# Patient Record
Sex: FEMALE | Race: WHITE
[De-identification: names, ages, dates, MRNs, and addresses within clinical notes are randomized per-mention and may not be internally consistent; named-entity substitution may affect disease eponyms.]

---

## 2020-08-20 LAB
BUN/CREAT SERPL: 11
CREAT SERPL-MCNC: 0.8 MG/DL (ref 0.6–1.3)
GFR SERPLBLD BASED ON 1.73 SQ M-ARVRAT: > 60 ML/MIN

## 2020-10-09 ENCOUNTER — HOSPITAL ENCOUNTER (OUTPATIENT)
Dept: HOSPITAL 75 - ONC | Age: 60
LOS: 39 days | Discharge: HOME | End: 2020-11-17
Attending: RADIOLOGY
Payer: MEDICARE

## 2020-10-09 DIAGNOSIS — Z51.0: Primary | ICD-10-CM

## 2020-10-09 DIAGNOSIS — C70.0: ICD-10-CM

## 2020-10-09 PROCEDURE — 77301 RADIOTHERAPY DOSE PLAN IMRT: CPT

## 2020-10-09 PROCEDURE — 77334 RADIATION TREATMENT AID(S): CPT

## 2020-10-09 PROCEDURE — 77336 RADIATION PHYSICS CONSULT: CPT

## 2020-10-09 PROCEDURE — 77386: CPT

## 2020-10-09 PROCEDURE — 77338 DESIGN MLC DEVICE FOR IMRT: CPT

## 2020-10-09 PROCEDURE — 77290 THER RAD SIMULAJ FIELD CPLX: CPT

## 2020-10-09 PROCEDURE — 84520 ASSAY OF UREA NITROGEN: CPT

## 2020-10-09 PROCEDURE — 82565 ASSAY OF CREATININE: CPT

## 2020-10-09 PROCEDURE — 77300 RADIATION THERAPY DOSE PLAN: CPT

## 2020-12-09 LAB
BUN/CREAT SERPL: 11
CREAT SERPL-MCNC: 0.82 MG/DL (ref 0.6–1.3)
GFR SERPLBLD BASED ON 1.73 SQ M-ARVRAT: > 60 ML/MIN

## 2020-12-10 ENCOUNTER — HOSPITAL ENCOUNTER (OUTPATIENT)
Dept: HOSPITAL 75 - RAD | Age: 60
End: 2020-12-10
Attending: RADIOLOGY
Payer: MEDICARE

## 2020-12-10 DIAGNOSIS — C70.0: Primary | ICD-10-CM

## 2020-12-10 PROCEDURE — 70553 MRI BRAIN STEM W/O & W/DYE: CPT

## 2020-12-10 NOTE — DIAGNOSTIC IMAGING REPORT
PROCEDURE: MR imaging of the brain with and without contrast.



TECHNIQUE: Multiplanar, multisequence MR imaging of the brain was

performed with and without contrast.



INDICATION: Brain surgery in 2005. History of meningioma. Memory

and gait issues.



COMPARISON: 05/29/2020.



FINDINGS: Prior right-sided craniotomy changes are noted. There

are numerous enhancing extra-axial lesions involving primarily

the right cerebral hemisphere. Enhancing lobular mass along the

right convexity overlying the right parietal lobe measures 2.8 x

2.1 cm and 2.6 cm craniocaudal, previously measuring 2.8 x 2.0 x

2.4 cm. A mass along the right aspect of the posterior falx

cerebri measures 1.9 x 1.3 cm and 2.3 cm craniocaudal, previously

measuring 2.1 x 1.5 x 2.4 cm. A multilobular mass along the right

convexity overlying the right frontotemporal region measures 4.4

x 2.0 cm and 3.5 cm craniocaudal, previously measuring 5.7 x 2.5

x 3.2 cm. There has been interval increase in associated edema in

the right parieto-occipital region with increased right-to-left

midline shift of 0.7 cm, previously measuring 0.5 cm. There is

continued effacement of the basilar cisterns. 



No acute ischemia or hemorrhage. No acute hydrocephalus. The

sellar and suprasellar regions have a normal appearance. 



There is caudal ectopia of the cerebellar tonsils with crowding

of the foramen magnum.



The paranasal sinuses and mastoid air cells demonstrate normal

signal characteristics. The globes and orbits are symmetric and

unremarkable. 



IMPRESSION:

1. Findings consistent with meningiomatosis, primarily involving

the right cerebral hemisphere. There has been interval increase

in size in a lobulated mass overlying the right parieto-occipital

region which may be responsible for the interval increase in

edema in the right parieto-occipital region and increased

right-to-left midline shift of 0.7 cm. There is associated

effacement of the basilar cisterns and caudal ectopia of the

cerebellar tonsils, suggestive of increased intracranial

pressure. No suzy uncal or tonsillar herniation is seen.

2. No acute ischemia or hemorrhage. No acute hydrocephalus.



Dictated by: 



  Dictated on workstation # [id number]

## 2021-02-11 ENCOUNTER — HOSPITAL ENCOUNTER (OUTPATIENT)
Dept: HOSPITAL 75 - ONC | Age: 61
LOS: 26 days | Discharge: HOME | End: 2021-03-09
Attending: RADIOLOGY
Payer: MEDICARE

## 2021-02-11 DIAGNOSIS — C70.0: ICD-10-CM

## 2021-02-11 DIAGNOSIS — Z51.0: Primary | ICD-10-CM

## 2021-02-11 LAB
ALBUMIN SERPL-MCNC: 4.1 GM/DL (ref 3.2–4.5)
ALP SERPL-CCNC: 75 U/L (ref 40–136)
ALT SERPL-CCNC: 29 U/L (ref 0–55)
BASOPHILS # BLD AUTO: 0 10^3/UL (ref 0–0.1)
BASOPHILS NFR BLD AUTO: 0 % (ref 0–10)
BILIRUB SERPL-MCNC: 0.2 MG/DL (ref 0.1–1)
BUN/CREAT SERPL: 18
CALCIUM SERPL-MCNC: 9.8 MG/DL (ref 8.5–10.1)
CHLORIDE SERPL-SCNC: 107 MMOL/L (ref 98–107)
CO2 SERPL-SCNC: 23 MMOL/L (ref 21–32)
CREAT SERPL-MCNC: 0.89 MG/DL (ref 0.6–1.3)
EOSINOPHIL # BLD AUTO: 0 10^3/UL (ref 0–0.3)
EOSINOPHIL NFR BLD AUTO: 0 % (ref 0–10)
GFR SERPLBLD BASED ON 1.73 SQ M-ARVRAT: > 60 ML/MIN
GLUCOSE SERPL-MCNC: 129 MG/DL (ref 70–105)
HCT VFR BLD CALC: 46 % (ref 35–52)
HGB BLD-MCNC: 14.5 G/DL (ref 11.5–16)
LYMPHOCYTES # BLD AUTO: 1.5 10^3/UL (ref 1–4)
LYMPHOCYTES NFR BLD AUTO: 21 % (ref 12–44)
MANUAL DIFFERENTIAL PERFORMED BLD QL: NO
MCH RBC QN AUTO: 30 PG (ref 25–34)
MCHC RBC AUTO-ENTMCNC: 31 G/DL (ref 32–36)
MCV RBC AUTO: 96 FL (ref 80–99)
MONOCYTES # BLD AUTO: 0.5 10^3/UL (ref 0–1)
MONOCYTES NFR BLD AUTO: 6 % (ref 0–12)
NEUTROPHILS # BLD AUTO: 5.3 10^3/UL (ref 1.8–7.8)
NEUTROPHILS NFR BLD AUTO: 72 % (ref 42–75)
PLATELET # BLD: 193 10^3/UL (ref 130–400)
PMV BLD AUTO: 9.4 FL (ref 9–12.2)
POTASSIUM SERPL-SCNC: 3.4 MMOL/L (ref 3.6–5)
PROT SERPL-MCNC: 6.7 GM/DL (ref 6.4–8.2)
SODIUM SERPL-SCNC: 137 MMOL/L (ref 135–145)
WBC # BLD AUTO: 7.4 10^3/UL (ref 4.3–11)

## 2021-02-11 PROCEDURE — 82565 ASSAY OF CREATININE: CPT

## 2021-02-11 PROCEDURE — 85025 COMPLETE CBC W/AUTO DIFF WBC: CPT

## 2021-02-11 PROCEDURE — 99213 OFFICE O/P EST LOW 20 MIN: CPT

## 2021-02-11 PROCEDURE — 80053 COMPREHEN METABOLIC PANEL: CPT

## 2021-02-11 PROCEDURE — 84520 ASSAY OF UREA NITROGEN: CPT

## 2021-02-19 ENCOUNTER — HOSPITAL ENCOUNTER (OUTPATIENT)
Dept: HOSPITAL 75 - RAD | Age: 61
End: 2021-02-19
Attending: RADIOLOGY
Payer: MEDICARE

## 2021-02-19 DIAGNOSIS — C70.0: Primary | ICD-10-CM

## 2021-02-19 PROCEDURE — 70553 MRI BRAIN STEM W/O & W/DYE: CPT

## 2021-02-19 NOTE — DIAGNOSTIC IMAGING REPORT
PROCEDURE: MR imaging of the brain with and without contrast.



TECHNIQUE: Multiplanar, multisequence MR imaging of the brain was

performed with and without contrast.



INDICATION:   History of meningiomatosis, left-sided weakness.

Multiple previous mass resections most recently in 2005. Compared

with the study 12/10/2020.



FINDINGS: 

there are multiple right greater than left convexity extra-axial

masses with underlying chronic calvarial reaction not

substantially changed from the previous exam. The dominant lesion

in the posterior parietal right convexity and extends

transversely a depth of 2.7 cm as measured in the coronal views.

It previously measured 2.6 cm maximal thickness. More posteriorly

a high posterior parietal lesion extends 2.5 cm depth unchanged.

The largest left frontal convexity lesion is 1 cm unchanged. A

right parafalcine lesion and effacing the contour of the medial

right occipital lobe has a thickness of 1.4 cm unchanged.

Perilesional vasogenic edema most conspicuous on the FLAIR

weighted pulse sequences is unchanged. When measured in the

coronal reconstructions there is an unchanged 6.5 mm  right to

left shift. Obliteration of the basilar cisterns are unchanged.

The 4th ventricle nondilated. Mass effect distorting the contours

of the right greater than left middle cerebellar peduncles and

midbrain unchanged. There is some mild medial displacement of the

right uncus without its suzy herniation. Mild caudal ectopia of

the cerebellar tonsils is unchanged. There is  right hemispheric

sulcal effacement unchanged. There were no findings of a cerebral

infarct or intracranial hemorrhage. Effacement and distortion of

the contour of the lateral ventricles unchanged. There is no

hydrocephalus. 



IMPRESSION: 

Multiple right greater than left hemispheric extra-axial masses

consistent with meningiomas showing substantial but unchanged

mass effect with stable perilesional vasogenic edema, right to

left shift, obliteration of the basilar cisterns, sulcal

effacement and distortion upon the midbrain. No substantial

change from the prior. No findings of an ischemic infarct or

hemorrhage. No hydrocephalus.



Dictated by: 



  Dictated on workstation # [id number]